# Patient Record
Sex: MALE | Race: WHITE | NOT HISPANIC OR LATINO | Employment: UNEMPLOYED | URBAN - METROPOLITAN AREA
[De-identification: names, ages, dates, MRNs, and addresses within clinical notes are randomized per-mention and may not be internally consistent; named-entity substitution may affect disease eponyms.]

---

## 2020-05-03 ENCOUNTER — HOSPITAL ENCOUNTER (EMERGENCY)
Facility: HOSPITAL | Age: 60
Discharge: HOME | End: 2020-05-03
Attending: EMERGENCY MEDICINE | Admitting: EMERGENCY MEDICINE

## 2020-05-03 VITALS
SYSTOLIC BLOOD PRESSURE: 143 MMHG | HEART RATE: 95 BPM | TEMPERATURE: 97.4 F | RESPIRATION RATE: 14 BRPM | OXYGEN SATURATION: 97 % | WEIGHT: 155.87 LBS | DIASTOLIC BLOOD PRESSURE: 77 MMHG

## 2020-05-03 DIAGNOSIS — T50.901A OVERDOSE: Primary | ICD-10-CM

## 2020-05-03 LAB
ATRIAL RATE: 94 BPM
P AXIS: 85 DEGREES
PR INTERVAL: 130 MS
QRS AXIS: 98 DEGREES
QRSD INTERVAL: 82 MS
QT INTERVAL: 376 MS
QTC INTERVAL: 470 MS
T WAVE AXIS: 83 DEGREES
VENTRICULAR RATE: 94 BPM

## 2020-05-03 PROCEDURE — 93005 ELECTROCARDIOGRAM TRACING: CPT

## 2020-05-03 PROCEDURE — 99284 EMERGENCY DEPT VISIT MOD MDM: CPT

## 2020-05-03 PROCEDURE — 93010 ELECTROCARDIOGRAM REPORT: CPT | Performed by: INTERNAL MEDICINE

## 2020-05-03 PROCEDURE — 99284 EMERGENCY DEPT VISIT MOD MDM: CPT | Performed by: EMERGENCY MEDICINE

## 2020-05-03 RX ORDER — NALOXONE HYDROCHLORIDE 1 MG/ML
1 INJECTION PARENTERAL ONCE
Status: COMPLETED | OUTPATIENT
Start: 2020-05-03 | End: 2020-05-03

## 2021-01-12 ENCOUNTER — APPOINTMENT (EMERGENCY)
Dept: CT IMAGING | Facility: HOSPITAL | Age: 61
DRG: 812 | End: 2021-01-12
Payer: COMMERCIAL

## 2021-01-12 ENCOUNTER — APPOINTMENT (EMERGENCY)
Dept: RADIOLOGY | Facility: HOSPITAL | Age: 61
DRG: 812 | End: 2021-01-12
Payer: COMMERCIAL

## 2021-01-12 ENCOUNTER — HOSPITAL ENCOUNTER (INPATIENT)
Facility: HOSPITAL | Age: 61
LOS: 1 days | Discharge: HOME | DRG: 812 | End: 2021-01-14
Attending: EMERGENCY MEDICINE | Admitting: FAMILY MEDICINE
Payer: COMMERCIAL

## 2021-01-12 DIAGNOSIS — H70.92 MASTOIDITIS OF LEFT SIDE: ICD-10-CM

## 2021-01-12 DIAGNOSIS — F19.10 POLYSUBSTANCE ABUSE (HCC): ICD-10-CM

## 2021-01-12 DIAGNOSIS — R09.02 HYPOXIA: ICD-10-CM

## 2021-01-12 DIAGNOSIS — H66.90 OM (OTITIS MEDIA), ACUTE: ICD-10-CM

## 2021-01-12 DIAGNOSIS — F11.10 HEROIN ABUSE (HCC): Primary | ICD-10-CM

## 2021-01-12 PROBLEM — J96.01 ACUTE RESPIRATORY FAILURE WITH HYPOXIA (HCC): Status: ACTIVE | Noted: 2021-01-12

## 2021-01-12 PROBLEM — N17.9 ACUTE KIDNEY INJURY (HCC): Status: ACTIVE | Noted: 2021-01-12

## 2021-01-12 PROBLEM — T50.901A DRUG OVERDOSE: Status: ACTIVE | Noted: 2021-01-12

## 2021-01-12 LAB
AMPHETAMINES SERPL QL SCN: POSITIVE
ANION GAP SERPL CALCULATED.3IONS-SCNC: 9 MMOL/L (ref 4–13)
ATRIAL RATE: 104 BPM
BARBITURATES UR QL: NEGATIVE
BASOPHILS # BLD AUTO: 0.05 THOUSANDS/ΜL (ref 0–0.1)
BASOPHILS NFR BLD AUTO: 0 % (ref 0–1)
BENZODIAZ UR QL: NEGATIVE
BUN SERPL-MCNC: 15 MG/DL (ref 5–25)
CALCIUM SERPL-MCNC: 8.9 MG/DL (ref 8.3–10.1)
CHLORIDE SERPL-SCNC: 101 MMOL/L (ref 100–108)
CO2 SERPL-SCNC: 28 MMOL/L (ref 21–32)
COCAINE UR QL: NEGATIVE
CREAT SERPL-MCNC: 1.7 MG/DL (ref 0.6–1.3)
EOSINOPHIL # BLD AUTO: 0.04 THOUSAND/ΜL (ref 0–0.61)
EOSINOPHIL NFR BLD AUTO: 0 % (ref 0–6)
ERYTHROCYTE [DISTWIDTH] IN BLOOD BY AUTOMATED COUNT: 13.5 % (ref 11.6–15.1)
FLUAV RNA RESP QL NAA+PROBE: NEGATIVE
FLUBV RNA RESP QL NAA+PROBE: NEGATIVE
GFR SERPL CREATININE-BSD FRML MDRD: 43 ML/MIN/1.73SQ M
GLUCOSE SERPL-MCNC: 88 MG/DL (ref 65–140)
HCT VFR BLD AUTO: 45.7 % (ref 36.5–49.3)
HGB BLD-MCNC: 14.1 G/DL (ref 12–17)
IMM GRANULOCYTES # BLD AUTO: 0.14 THOUSAND/UL (ref 0–0.2)
IMM GRANULOCYTES NFR BLD AUTO: 1 % (ref 0–2)
LYMPHOCYTES # BLD AUTO: 0.91 THOUSANDS/ΜL (ref 0.6–4.47)
LYMPHOCYTES NFR BLD AUTO: 5 % (ref 14–44)
MCH RBC QN AUTO: 27.8 PG (ref 26.8–34.3)
MCHC RBC AUTO-ENTMCNC: 30.9 G/DL (ref 31.4–37.4)
MCV RBC AUTO: 90 FL (ref 82–98)
METHADONE UR QL: NEGATIVE
MONOCYTES # BLD AUTO: 0.85 THOUSAND/ΜL (ref 0.17–1.22)
MONOCYTES NFR BLD AUTO: 5 % (ref 4–12)
NEUTROPHILS # BLD AUTO: 16.04 THOUSANDS/ΜL (ref 1.85–7.62)
NEUTS SEG NFR BLD AUTO: 89 % (ref 43–75)
NRBC BLD AUTO-RTO: 0 /100 WBCS
OPIATES UR QL SCN: NEGATIVE
OXYCODONE+OXYMORPHONE UR QL SCN: NEGATIVE
P AXIS: 72 DEGREES
PCP UR QL: NEGATIVE
PLATELET # BLD AUTO: 328 THOUSANDS/UL (ref 149–390)
PMV BLD AUTO: 9.3 FL (ref 8.9–12.7)
POTASSIUM SERPL-SCNC: 4.2 MMOL/L (ref 3.5–5.3)
PR INTERVAL: 118 MS
QRS AXIS: 99 DEGREES
QRSD INTERVAL: 78 MS
QT INTERVAL: 366 MS
QTC INTERVAL: 481 MS
RBC # BLD AUTO: 5.08 MILLION/UL (ref 3.88–5.62)
RSV RNA RESP QL NAA+PROBE: NEGATIVE
SARS-COV-2 RNA RESP QL NAA+PROBE: NEGATIVE
SODIUM SERPL-SCNC: 138 MMOL/L (ref 136–145)
T WAVE AXIS: 73 DEGREES
THC UR QL: NEGATIVE
TROPONIN I SERPL-MCNC: 0.02 NG/ML
VENTRICULAR RATE: 104 BPM
WBC # BLD AUTO: 18.03 THOUSAND/UL (ref 4.31–10.16)

## 2021-01-12 PROCEDURE — 80307 DRUG TEST PRSMV CHEM ANLYZR: CPT | Performed by: EMERGENCY MEDICINE

## 2021-01-12 PROCEDURE — 99220 PR INITIAL OBSERVATION CARE/DAY 70 MINUTES: CPT | Performed by: STUDENT IN AN ORGANIZED HEALTH CARE EDUCATION/TRAINING PROGRAM

## 2021-01-12 PROCEDURE — 99291 CRITICAL CARE FIRST HOUR: CPT | Performed by: EMERGENCY MEDICINE

## 2021-01-12 PROCEDURE — 71046 X-RAY EXAM CHEST 2 VIEWS: CPT

## 2021-01-12 PROCEDURE — 0241U HB NFCT DS VIR RESP RNA 4 TRGT: CPT | Performed by: EMERGENCY MEDICINE

## 2021-01-12 PROCEDURE — 36415 COLL VENOUS BLD VENIPUNCTURE: CPT | Performed by: EMERGENCY MEDICINE

## 2021-01-12 PROCEDURE — 84484 ASSAY OF TROPONIN QUANT: CPT | Performed by: EMERGENCY MEDICINE

## 2021-01-12 PROCEDURE — 70450 CT HEAD/BRAIN W/O DYE: CPT

## 2021-01-12 PROCEDURE — 93005 ELECTROCARDIOGRAM TRACING: CPT

## 2021-01-12 PROCEDURE — 85025 COMPLETE CBC W/AUTO DIFF WBC: CPT | Performed by: EMERGENCY MEDICINE

## 2021-01-12 PROCEDURE — 80048 BASIC METABOLIC PNL TOTAL CA: CPT | Performed by: EMERGENCY MEDICINE

## 2021-01-12 PROCEDURE — 99285 EMERGENCY DEPT VISIT HI MDM: CPT

## 2021-01-12 PROCEDURE — 93010 ELECTROCARDIOGRAM REPORT: CPT | Performed by: INTERNAL MEDICINE

## 2021-01-12 RX ORDER — SENNOSIDES 8.6 MG
1 TABLET ORAL DAILY
Status: DISCONTINUED | OUTPATIENT
Start: 2021-01-12 | End: 2021-01-14 | Stop reason: HOSPADM

## 2021-01-12 RX ORDER — NALOXONE HYDROCHLORIDE 1 MG/ML
2 INJECTION INTRAMUSCULAR; INTRAVENOUS; SUBCUTANEOUS ONCE
Status: COMPLETED | OUTPATIENT
Start: 2021-01-12 | End: 2021-01-12

## 2021-01-12 RX ORDER — NALOXONE HYDROCHLORIDE 1 MG/ML
1 INJECTION PARENTERAL ONCE
Status: COMPLETED | OUTPATIENT
Start: 2021-01-12 | End: 2021-01-12

## 2021-01-12 RX ORDER — ONDANSETRON 2 MG/ML
4 INJECTION INTRAMUSCULAR; INTRAVENOUS EVERY 6 HOURS PRN
Status: DISCONTINUED | OUTPATIENT
Start: 2021-01-12 | End: 2021-01-14 | Stop reason: HOSPADM

## 2021-01-12 RX ORDER — NALOXONE HYDROCHLORIDE 0.4 MG/ML
1 INJECTION, SOLUTION INTRAMUSCULAR; INTRAVENOUS; SUBCUTANEOUS ONCE
Status: COMPLETED | OUTPATIENT
Start: 2021-01-12 | End: 2021-01-12

## 2021-01-12 RX ORDER — NALOXONE HYDROCHLORIDE 0.4 MG/ML
2 INJECTION, SOLUTION INTRAMUSCULAR; INTRAVENOUS; SUBCUTANEOUS ONCE
Status: DISCONTINUED | OUTPATIENT
Start: 2021-01-12 | End: 2021-01-12

## 2021-01-12 RX ORDER — ACETAMINOPHEN 325 MG/1
650 TABLET ORAL EVERY 6 HOURS PRN
Status: DISCONTINUED | OUTPATIENT
Start: 2021-01-12 | End: 2021-01-14 | Stop reason: HOSPADM

## 2021-01-12 RX ORDER — HEPARIN SODIUM 5000 [USP'U]/ML
5000 INJECTION, SOLUTION INTRAVENOUS; SUBCUTANEOUS EVERY 8 HOURS SCHEDULED
Status: DISCONTINUED | OUTPATIENT
Start: 2021-01-12 | End: 2021-01-14 | Stop reason: HOSPADM

## 2021-01-12 RX ORDER — SODIUM CHLORIDE, SODIUM LACTATE, POTASSIUM CHLORIDE, CALCIUM CHLORIDE 600; 310; 30; 20 MG/100ML; MG/100ML; MG/100ML; MG/100ML
100 INJECTION, SOLUTION INTRAVENOUS CONTINUOUS
Status: DISCONTINUED | OUTPATIENT
Start: 2021-01-12 | End: 2021-01-14 | Stop reason: HOSPADM

## 2021-01-12 RX ORDER — CALCIUM CARBONATE 200(500)MG
1000 TABLET,CHEWABLE ORAL DAILY PRN
Status: DISCONTINUED | OUTPATIENT
Start: 2021-01-12 | End: 2021-01-14 | Stop reason: HOSPADM

## 2021-01-12 RX ORDER — DOCUSATE SODIUM 100 MG/1
100 CAPSULE, LIQUID FILLED ORAL 2 TIMES DAILY
Status: DISCONTINUED | OUTPATIENT
Start: 2021-01-12 | End: 2021-01-14 | Stop reason: HOSPADM

## 2021-01-12 RX ADMIN — SODIUM CHLORIDE, SODIUM LACTATE, POTASSIUM CHLORIDE, AND CALCIUM CHLORIDE 1000 ML: .6; .31; .03; .02 INJECTION, SOLUTION INTRAVENOUS at 09:25

## 2021-01-12 RX ADMIN — NALOXONE HYDROCHLORIDE 1 MG: 0.4 INJECTION, SOLUTION INTRAMUSCULAR; INTRAVENOUS; SUBCUTANEOUS at 09:23

## 2021-01-12 RX ADMIN — SODIUM CHLORIDE, SODIUM LACTATE, POTASSIUM CHLORIDE, AND CALCIUM CHLORIDE 100 ML/HR: .6; .31; .03; .02 INJECTION, SOLUTION INTRAVENOUS at 15:14

## 2021-01-12 RX ADMIN — SENNOSIDES 8.6 MG: 8.6 TABLET ORAL at 15:09

## 2021-01-12 RX ADMIN — NALOXONE HYDROCHLORIDE 0.4 MG/HR: 1 INJECTION PARENTERAL at 21:39

## 2021-01-12 RX ADMIN — NALOXONE HYDROCHLORIDE 2 MG: 1 INJECTION PARENTERAL at 10:59

## 2021-01-12 RX ADMIN — HEPARIN SODIUM 5000 UNITS: 5000 INJECTION INTRAVENOUS; SUBCUTANEOUS at 15:11

## 2021-01-12 RX ADMIN — NALOXONE HYDROCHLORIDE 0.4 MG/HR: 1 INJECTION PARENTERAL at 15:05

## 2021-01-12 RX ADMIN — PIPERACILLIN AND TAZOBACTAM 3.38 G: 36; 4.5 INJECTION, POWDER, FOR SOLUTION INTRAVENOUS at 17:29

## 2021-01-12 NOTE — ASSESSMENT & PLAN NOTE
· Presenting to the ED with drug overdose suspected meth and heroin  · Receive several doses of Narcan with minimal improvement, now on Narcan infusion  · Keep NPO, start aspiration precautions, monitor mental status with serial neuro checks  · Consider poison Control evaluation in a m   The patient's mental status does not improve  · Monitor for signs of withdrawal, will hold off on clonidine, Tylenol, Zofran for now given lethargic mental status  · CT head negative for acute pathology, incidental finding of mastoiditis

## 2021-01-12 NOTE — ASSESSMENT & PLAN NOTE
· Likely secondary to drug overdose, on 3 L supplemental oxygen  · Concern for aspiration pneumonia  · Start Zosyn, follow-up infectious workup

## 2021-01-12 NOTE — ED PROVIDER NOTES
History  Chief Complaint   Patient presents with    Overdose - Accidental     Pt presents following overdose on heroin and meth  EMS found pts friends attempting CPR, laying on the ground surrounded by frozen chicken drenched with water  60-year-old male presents for evaluation after a drug overdose  Per EMS, the patient was found minimally responsive with friends/other drug users attempting CPR  EMS administered 2 mg of Narcan IV and the patient became more responsive  On my initial evaluation the patient is somnolent but arousable  He was able to answer some simple questions but is not able to provide a detailed history of present illness  He admits that he has been on a meth binge and has not slept for about three days  He used heroin this morning was unable to specify how much  Under review the medical record the patient has been seen in the emergency department before for drug overdoses  The patient denies any other medical problems  History provided by:  Patient   used: No        None       History reviewed  No pertinent past medical history  History reviewed  No pertinent surgical history  History reviewed  No pertinent family history  I have reviewed and agree with the history as documented  E-Cigarette/Vaping     E-Cigarette/Vaping Substances     Social History     Tobacco Use    Smoking status: Current Every Day Smoker     Types: Cigars    Smokeless tobacco: Former User   Substance Use Topics    Alcohol use: Not Currently     Frequency: Never    Drug use: Yes     Types: Fentanyl, Methamphetamines, Heroin       Review of Systems   Unable to perform ROS: Acuity of condition       Physical Exam  Physical Exam  Vitals signs and nursing note reviewed  Constitutional:       Appearance: He is well-developed  Comments: Somnolent but arousable  Disheveled  HENT:      Head: Normocephalic and atraumatic        Mouth/Throat:      Mouth: Mucous membranes are moist       Pharynx: Oropharynx is clear  Eyes:      Conjunctiva/sclera: Conjunctivae normal    Neck:      Musculoskeletal: Neck supple  Cardiovascular:      Rate and Rhythm: Normal rate and regular rhythm  Heart sounds: No murmur  Pulmonary:      Effort: Pulmonary effort is normal  No respiratory distress  Breath sounds: Normal breath sounds  Abdominal:      Palpations: Abdomen is soft  Tenderness: There is no abdominal tenderness  Skin:     General: Skin is warm and dry  Capillary Refill: Capillary refill takes less than 2 seconds  Neurological:      General: No focal deficit present  Comments: Somnolent but arousable    No gross focal deficits but unable to participate in detailed neurologic exam          Vital Signs  ED Triage Vitals [01/12/21 0747]   Temp Pulse Respirations Blood Pressure SpO2   -- (!) 111 20 133/72 93 %      Temp src Heart Rate Source Patient Position - Orthostatic VS BP Location FiO2 (%)   -- Monitor Lying Right arm --      Pain Score       --           Vitals:    01/12/21 1230 01/12/21 1315 01/12/21 1330 01/12/21 1400   BP: 159/81  154/84 152/83   Pulse: 99 96 96 97   Patient Position - Orthostatic VS: Lying  Lying Lying         Visual Acuity      ED Medications  Medications   acetaminophen (TYLENOL) tablet 650 mg (has no administration in time range)   calcium carbonate (TUMS) chewable tablet 1,000 mg (has no administration in time range)   docusate sodium (COLACE) capsule 100 mg (has no administration in time range)   senna (SENOKOT) tablet 8 6 mg (has no administration in time range)   ondansetron (ZOFRAN) injection 4 mg (has no administration in time range)   heparin (porcine) subcutaneous injection 5,000 Units (has no administration in time range)   lactated ringers infusion (has no administration in time range)   naloxone (NARCAN) 2 mg in sodium chloride 0 9 % 500 mL infusion (0 4 mg/hr Intravenous New Bag 1/12/21 1505)   naloxone (NARCAN) injection 1 mg (1 mg Intravenous Given 1/12/21 0923)   lactated ringers bolus 1,000 mL (0 mL Intravenous Stopped 1/12/21 1025)   naloxone (NARCAN) injection 2 mg (2 mg Intravenous Given 1/12/21 1059)   naloxone (FOR EMS ONLY) [facility]) 2 MG/2ML injection 2 mg (0 mg Does not apply Given to EMS 1/12/21 1301)       Diagnostic Studies  Results Reviewed     Procedure Component Value Units Date/Time    COVID19, Influenza A/B, RSV PCR, [facility] [[id number]]  (Normal) Collected: 01/12/21 1418    Lab Status: Final result Specimen: Nares from Nasopharyngeal Swab Updated: 01/12/21 1505     SARS-CoV-2 Negative     INFLUENZA A PCR Negative     INFLUENZA B PCR Negative     RSV PCR Negative    Narrative: This test has been authorized by FDA under an EUA (Emergency Use Assay) for use by authorized laboratories  Clinical caution and judgement should be used with the interpretation of these results with consideration of the clinical impression and other laboratory testing  Testing reported as "Positive" or "Negative" has been proven to be accurate according to standard laboratory validation requirements  All testing is performed with control materials showing appropriate reactivity at standard intervals      Rapid drug screen, urine [[id number]]     Lab Status: No result Specimen: Urine     Troponin I [[id number]]  (Normal) Collected: 01/12/21 0847    Lab Status: Final result Specimen: Blood from Hand, Right Updated: 01/12/21 0910     Troponin I 0 02 ng/mL     Basic metabolic panel [[id number]]  (Abnormal) Collected: 01/12/21 0847    Lab Status: Final result Specimen: Blood from Hand, Right Updated: 01/12/21 0900     Sodium 138 mmol/L      Potassium 4 2 mmol/L      Chloride 101 mmol/L      CO2 28 mmol/L      ANION GAP 9 mmol/L      BUN 15 mg/dL      Creatinine 1 70 mg/dL      Glucose 88 mg/dL      Calcium 8 9 mg/dL      eGFR 43 ml/min/1 73sq m     Narrative:      [place] guidelines for Chronic Kidney Disease (CKD):     Stage 1 with normal or high GFR (GFR > 90 mL/min/1 73 square meters)    Stage 2 Mild CKD (GFR = 60-89 mL/min/1 73 square meters)    Stage 3A Moderate CKD (GFR = 45-59 mL/min/1 73 square meters)    Stage 3B Moderate CKD (GFR = 30-44 mL/min/1 73 square meters)    Stage 4 Severe CKD (GFR = 15-29 mL/min/1 73 square meters)    Stage 5 End Stage CKD (GFR <15 mL/min/1 73 square meters)  Note: GFR calculation is accurate only with a steady state creatinine    CBC and differential [[id number]]  (Abnormal) Collected: 01/12/21 0847    Lab Status: Final result Specimen: Blood from Hand, Right Updated: 01/12/21 0852     WBC 18 03 Thousand/uL      RBC 5 08 Million/uL      Hemoglobin 14 1 g/dL      Hematocrit 45 7 %      MCV 90 fL      MCH 27 8 pg      MCHC 30 9 g/dL      RDW 13 5 %      MPV 9 3 fL      Platelets 600 Thousands/uL      nRBC 0 /100 WBCs      Neutrophils Relative 89 %      Immat GRANS % 1 %      Lymphocytes Relative 5 %      Monocytes Relative 5 %      Eosinophils Relative 0 %      Basophils Relative 0 %      Neutrophils Absolute 16 04 Thousands/µL      Immature Grans Absolute 0 14 Thousand/uL      Lymphocytes Absolute 0 91 Thousands/µL      Monocytes Absolute 0 85 Thousand/µL      Eosinophils Absolute 0 04 Thousand/µL      Basophils Absolute 0 05 Thousands/µL                  CT head without contrast   Final Result by Joey Franklin DO (01/12 6235)   No acute intracranial abnormality  Acute on chronic sinusitis  Age-indeterminate mild left mastoiditis  Traceable to fluid in the middle ear canals bilaterally  Workstation performed: [id number]         XR chest 2 views   Final Result by Yulia Gibbons MD (01/12 1025)      No acute cardiopulmonary disease        Workstation performed: [id number]                    Procedures  ECG 12 Lead Documentation Only    Date/Time: 1/12/2021 8:04 AM  Performed by: Tanisha Guerin MD  Authorized by: Tanisha Guerin MD     ECG reviewed by me, the ED Provider: yes    Patient location:  ED  Previous ECG:     Previous ECG:  Compared to current    Comparison ECG info: May of 2020    Similarity:  No change  Interpretation:     Interpretation: abnormal    Rate:     ECG rate:  104    ECG rate assessment: tachycardic    Rhythm:     Rhythm: sinus rhythm    Ectopy:     Ectopy: none    QRS:     QRS axis:  Right    QRS intervals:  Normal  Conduction:     Conduction: normal    ST segments:     ST segments:  Normal  T waves:     T waves: normal    CriticalCare Time  Performed by: Donnell Kothari MD  Authorized by: Donnell Kothari MD     Critical care provider statement:     Critical care time (minutes):  41    Critical care time was exclusive of:  Separately billable procedures and treating other patients and teaching time    Critical care was necessary to treat or prevent imminent or life-threatening deterioration of the following conditions:  Respiratory failure    Critical care was time spent personally by me on the following activities:  Blood draw for specimens, obtaining history from patient or surrogate, development of treatment plan with patient or surrogate, discussions with consultants, evaluation of patient's response to treatment, examination of patient, ordering and performing treatments and interventions, ordering and review of laboratory studies, ordering and review of radiographic studies, re-evaluation of patient's condition and review of old charts    I assumed direction of critical care for this patient from another provider in my specialty: no               ED Course                             SBIRT 20yo+      Most Recent Value   SBIRT (25 yo +)   In order to provide better care to our patients, we are screening all of our patients for alcohol and drug use  Would it be okay to ask you these screening questions?   Unable to answer at this time Filed at: 01/12/2021 0756                    [facility]  Number of Diagnoses or Management Options  Heroin abuse [facility]): new and requires workup  Polysubstance abuse [facility]): new and requires workup  Diagnosis management comments: 52-year-old male presented after a polysubstance overdose  He responded to multiple doses of Narcan but would continue to become hypoxic every time we attempted to wean him off of oxygen by nasal cannula  Does have a elevated white blood cell count of [address] and acute kidney injury with creatinine of 1 7  Given his continued hypoxia will require Narcan drip and admission for observation  CT of the head was negative for traumatic injury  COVID swab is pending  Will continue to reassess  Amount and/or Complexity of Data Reviewed  Clinical lab tests: ordered and reviewed  Tests in the radiology section of CPT®: reviewed and ordered  Review and summarize past medical records: yes  Independent visualization of images, tracings, or specimens: yes        Disposition  Final diagnoses:   Heroin abuse ([facility] [address] )   [facility] ([facility] )   Hypoxia     Time reflects when diagnosis was documented in both MDM as applicable and the Disposition within this note     Time User Action Codes Description Comment    1/12/2021  2:19 PM Oneta Lace Add [F11 10] Heroin abuse ([facility] [address] )     1/12/2021  2:19 PM Oneta Lace Add [F19 10] Polysubstance abuse ([facility] )     1/12/2021  3:05 PM Oneta Lace Add [R09 02] Hypoxia       ED Disposition     ED Disposition Condition Date/Time Comment    Admit Stable Tue Jan 12, 2021  2:19 PM Case was discussed with LÁZARO and the patient's admission status was agreed to be Admission Status: observation status to the service of Dr Justina Wilkerson    None         Patient's Medications    No medications on file     No discharge procedures on file      PDMP Review     None          ED Provider  Electronically Signed by           Lopez Patterson MD  01/12/21 8554

## 2021-01-12 NOTE — H&P
H&P- Riaz Bello 1960, 62 y o  male MRN: [id number]    Unit/Bed#: ED 17 Encounter: [id number]    Primary Care Provider: No primary care provider on file  Date and time admitted to hospital: 1/12/2021  7:44 AM        Drug overdose  Assessment & Plan  · Presenting to the ED with drug overdose suspected meth and heroin  · Receive several doses of Narcan with minimal improvement, now on Narcan infusion  · Keep NPO, start aspiration precautions, monitor mental status with serial neuro checks  · Consider poison Control evaluation in a m  The patient's mental status does not improve  · Monitor for signs of withdrawal, will hold off on clonidine, Tylenol, Zofran for now given lethargic mental status  · CT head negative for acute pathology, incidental finding of mastoiditis    Acute respiratory failure with hypoxia (HCC)  Assessment & Plan  · Likely secondary to drug overdose, on 3 L supplemental oxygen  · Concern for aspiration pneumonia  · Start Zosyn, follow-up infectious workup    Acute kidney injury ([facility] [address] )  Assessment & Plan  · Start IV fluids, monitor creatinine  · Likely prerenal in setting of dehydration    Mastoiditis of left side  Assessment & Plan  · Incidental finding on CT imaging  · Recommend outpatient follow-up      VTE Prophylaxis: Heparin  / sequential compression device   Code Status:  Full code  POLST: POLST form is not discussed and not completed at this time  Anticipated Length of Stay:  Patient will be admitted on an Observation basis with an anticipated length of stay of   2 midnights  Justification for Hospital Stay:  Drug overdose    Total Time for Visit, including Counseling / Coordination of Care: 30 minutes  Greater than 50% of this total time spent on direct patient counseling and coordination of care  Chief Complaint:   Drug overdose    History of Present Illness:    Riaz Bello is a 62 y o  male who presents with drug overdose    Patient was found unresponsive in a drug then surrounded by frozen chickens, getting CPR from his friends, on EMS arrival   No further information can be obtained from patient due to his lethargic/obtunded mental status  Now admitted to Medicine status post 2 rounds of IV Narcan with minimal improvement, on Narcan infusion  Review of Systems:    Unable to obtain review of systems    Past Medical and Surgical History:     History reviewed  No pertinent past medical history  History reviewed  No pertinent surgical history  Meds/Allergies:    Prior to Admission medications    Not on File     I have been unable to obtain / verify an up to date medication list despite all reasonable attempts  Allergies: No Known Allergies    Social History:     Marital Status: Single   Occupation: na  Patient Pre-hospital Living Situation: na  Patient Pre-hospital Level of Mobility: na  Patient Pre-hospital Diet Restrictions: na  Substance Use History:   Social History     Substance and Sexual Activity   Alcohol Use Not Currently    Frequency: Never     Social History     Tobacco Use   Smoking Status Current Every Day Smoker    Types: Cigars   Smokeless Tobacco Former User     Social History     Substance and Sexual Activity   Drug Use Yes    Types: Fentanyl, Methamphetamines, Heroin       Family History:    History reviewed  No pertinent family history  Physical Exam:     Vitals:   Blood Pressure: 152/83 (01/12/21 1400)  Pulse: 97 (01/12/21 1400)  Respirations: 12 (01/12/21 1400)  Height: 5' 9" (175 3 cm) (01/12/21 0747)  Weight - Scale: 75 9 kg (167 lb 5 3 oz) (01/12/21 0747)  SpO2: 97 % (01/12/21 1400)    Physical Exam  Constitutional:       Comments: Malodorous, unkempt   HENT:      Head: Normocephalic and atraumatic  Nose: Nose normal  No rhinorrhea  Cardiovascular:      Rate and Rhythm: Normal rate and regular rhythm  Pulmonary:      Comments: On 3 L supplemental oxygen  Not in respiratory distress  Abdominal:      General: Abdomen is flat  Palpations: Abdomen is soft  Musculoskeletal:      Right lower leg: No edema  Left lower leg: No edema  Skin:     General: Skin is warm and dry  Neurological:      Mental Status: He is disoriented  Comments: Drowsy mental status   Psychiatric:      Comments: Mood and behavior abnormal           Additional Data:     Lab Results: I have personally reviewed pertinent reports  Results from last 7 days   Lab Units 01/12/21  0847   WBC Thousand/uL 18 03*   HEMOGLOBIN g/dL 14 1   HEMATOCRIT % 45 7   PLATELETS Thousands/uL 328   NEUTROS PCT % 89*   LYMPHS PCT % 5*   MONOS PCT % 5   EOS PCT % 0     Results from last 7 days   Lab Units 01/12/21  0847   SODIUM mmol/L 138   POTASSIUM mmol/L 4 2   CHLORIDE mmol/L 101   CO2 mmol/L 28   BUN mg/dL 15   CREATININE mg/dL 1 70*   ANION GAP mmol/L 9   CALCIUM mg/dL 8 9   GLUCOSE RANDOM mg/dL 88                       Imaging: I have personally reviewed pertinent reports  CT head without contrast   Final Result by Marcelle Pacheco DO (01/12 1405)   No acute intracranial abnormality  Acute on chronic sinusitis  Age-indeterminate mild left mastoiditis  Traceable to fluid in the middle ear canals bilaterally  Workstation performed: [id number]         XR chest 2 views   Final Result by Eusebio Tolbert MD (01/12 1025)      No acute cardiopulmonary disease  Workstation performed: [id number]             EKG, Pathology, and Other Studies Reviewed on Admission:   · EKG:  Sinus tachycardia    Allscripts / Epic Records Reviewed: Yes     ** Please Note: This note has been constructed using a voice recognition system   **

## 2021-01-13 LAB
ANION GAP SERPL CALCULATED.3IONS-SCNC: 4 MMOL/L (ref 4–13)
BUN SERPL-MCNC: 12 MG/DL (ref 5–25)
CALCIUM SERPL-MCNC: 8.1 MG/DL (ref 8.3–10.1)
CHLORIDE SERPL-SCNC: 101 MMOL/L (ref 100–108)
CO2 SERPL-SCNC: 29 MMOL/L (ref 21–32)
CREAT SERPL-MCNC: 1.27 MG/DL (ref 0.6–1.3)
ERYTHROCYTE [DISTWIDTH] IN BLOOD BY AUTOMATED COUNT: 13.5 % (ref 11.6–15.1)
GFR SERPL CREATININE-BSD FRML MDRD: 61 ML/MIN/1.73SQ M
GLUCOSE P FAST SERPL-MCNC: 98 MG/DL (ref 65–99)
GLUCOSE SERPL-MCNC: 98 MG/DL (ref 65–140)
HCT VFR BLD AUTO: 38.9 % (ref 36.5–49.3)
HGB BLD-MCNC: 12.3 G/DL (ref 12–17)
MAGNESIUM SERPL-MCNC: 1.8 MG/DL (ref 1.6–2.6)
MCH RBC QN AUTO: 27.5 PG (ref 26.8–34.3)
MCHC RBC AUTO-ENTMCNC: 31.6 G/DL (ref 31.4–37.4)
MCV RBC AUTO: 87 FL (ref 82–98)
PLATELET # BLD AUTO: 252 THOUSANDS/UL (ref 149–390)
PMV BLD AUTO: 9.4 FL (ref 8.9–12.7)
POTASSIUM SERPL-SCNC: 3.9 MMOL/L (ref 3.5–5.3)
RBC # BLD AUTO: 4.48 MILLION/UL (ref 3.88–5.62)
SODIUM SERPL-SCNC: 134 MMOL/L (ref 136–145)
WBC # BLD AUTO: 16.52 THOUSAND/UL (ref 4.31–10.16)

## 2021-01-13 PROCEDURE — 36415 COLL VENOUS BLD VENIPUNCTURE: CPT | Performed by: STUDENT IN AN ORGANIZED HEALTH CARE EDUCATION/TRAINING PROGRAM

## 2021-01-13 PROCEDURE — 83735 ASSAY OF MAGNESIUM: CPT | Performed by: STUDENT IN AN ORGANIZED HEALTH CARE EDUCATION/TRAINING PROGRAM

## 2021-01-13 PROCEDURE — 85027 COMPLETE CBC AUTOMATED: CPT | Performed by: STUDENT IN AN ORGANIZED HEALTH CARE EDUCATION/TRAINING PROGRAM

## 2021-01-13 PROCEDURE — 80048 BASIC METABOLIC PNL TOTAL CA: CPT | Performed by: STUDENT IN AN ORGANIZED HEALTH CARE EDUCATION/TRAINING PROGRAM

## 2021-01-13 PROCEDURE — 99232 SBSQ HOSP IP/OBS MODERATE 35: CPT | Performed by: NURSE PRACTITIONER

## 2021-01-13 RX ORDER — VANCOMYCIN HYDROCHLORIDE 1 G/200ML
12.5 INJECTION, SOLUTION INTRAVENOUS EVERY 12 HOURS
Status: DISCONTINUED | OUTPATIENT
Start: 2021-01-13 | End: 2021-01-14 | Stop reason: HOSPADM

## 2021-01-13 RX ADMIN — PIPERACILLIN AND TAZOBACTAM 3.38 G: 36; 4.5 INJECTION, POWDER, FOR SOLUTION INTRAVENOUS at 12:58

## 2021-01-13 RX ADMIN — PIPERACILLIN AND TAZOBACTAM 3.38 G: 36; 4.5 INJECTION, POWDER, FOR SOLUTION INTRAVENOUS at 07:06

## 2021-01-13 RX ADMIN — HEPARIN SODIUM 5000 UNITS: 5000 INJECTION INTRAVENOUS; SUBCUTANEOUS at 14:36

## 2021-01-13 RX ADMIN — VANCOMYCIN HYDROCHLORIDE 1000 MG: 1 INJECTION, SOLUTION INTRAVENOUS at 22:03

## 2021-01-13 RX ADMIN — PIPERACILLIN AND TAZOBACTAM 3.38 G: 36; 4.5 INJECTION, POWDER, FOR SOLUTION INTRAVENOUS at 00:00

## 2021-01-13 RX ADMIN — PIPERACILLIN AND TAZOBACTAM 3.38 G: 36; 4.5 INJECTION, POWDER, FOR SOLUTION INTRAVENOUS at 20:28

## 2021-01-13 RX ADMIN — SODIUM CHLORIDE, SODIUM LACTATE, POTASSIUM CHLORIDE, AND CALCIUM CHLORIDE 100 ML/HR: .6; .31; .03; .02 INJECTION, SOLUTION INTRAVENOUS at 07:06

## 2021-01-13 NOTE — ASSESSMENT & PLAN NOTE
· Likely secondary to drug overdose, on 1-2 L supplemental oxygen  · Chest x-ray negative for pneumonia  · Hypoxia continuing to improve with Narcan drip overnight

## 2021-01-13 NOTE — UTILIZATION REVIEW
Initial Clinical Review    OBS order 1/12 1422 converted to IP on 1/13 @ 1256 for drug  Overdose w/ acute resp failure w/ hypoxia  Level of Care Med Surg    Estimated length of stay More than 2 Midnights    Certification I certify that inpatient services are medically necessary for this patient for a duration of greater than two midnights  See H&P and MD Progress Notes for additional information about the patient's course of treatment  ED Arrival Information     Expected Arrival Acuity Means of Arrival Escorted By Service Admission Type    - 1/12/2021 07:44 Urgent Ambulance [facility]) Hospitalist Urgent    Arrival Complaint    OVERDOSE        Chief Complaint   Patient presents with    Overdose - Accidental     Pt presents following overdose on heroin and meth  EMS found pts friends attempting CPR, laying on the ground surrounded by frozen chicken drenched with water  Assessment/Plan: 61 yo male to ED via EMS  , was found unresponsive surrounded by frozen chickens, getting CPR from his friends on EMSs arrival   Given Narcan x2 for his lethargic obtunded mental status  Minimal improvement after Narcan bolus ,  Placed on infusion   Admitted OBS status for drug overdose  Suspected meth and heroin   Keep NPO , aspiration precautions , serial neuro checks   Monitor for signs of withdrae , hold off on clonidine , tylenol , zofran for lethargy   CT head neg   On 3l NC , concern for aspiration , start zosyn f/u labs   ANCA start IVF and monitor   1/13 IM Note   Pt reports some chest tenderness   Admits he used meth yest from a different supplier  Hypoxia cont to improve w/ Narcan gtt over night   On 1-2 l NC   Cont aspiration precautions  Incidental finding of mastoiditis L side found on CT , pt w/ dec hearing and bloody pus oozing from ear  Plan to cont zosyn        ED Triage Vitals [01/12/21 0747]   Temperature Pulse Respirations Blood Pressure SpO2   98 1 °F (36 7 °C) (!) 111 20 133/72 93 % Temp Source Heart Rate Source Patient Position - Orthostatic VS BP Location FiO2 (%)   Oral Monitor Lying Right arm --      Pain Score       --          Wt Readings from Last 1 Encounters:   01/12/21 75 9 kg (167 lb 5 3 oz)     Additional Vital Signs:   01/13/21 0600    88  15  138/78  103  98 %  36  4 L/min  Nasal cannula  Lying   01/13/21 0545    91  15      97 %           01/13/21 0456    101  16  114/61  80  97 %  36  4 L/min  Nasal cannula  Sitting   01/13/21 0000    97  19  121/64  86  93 %  36  4 L/min  Nasal cannula  Lying   01/12/21 2130    101  17  135/71  96  97 %  36  4 L/min  Nasal cannula  Sitting   01/12/21 2030    101  16  155/73  105  90 %  36  4 L/min  Nasal cannula  Sitting   01/12/21 2000    100  19  150/74  105  91 %  36  4 L/min  Nasal cannula  Lying   01/12/21 1400    97  12  152/83  110  97 %  32  3 L/min  Nasal cannula  Lying   01/12/21 1330    96  14  154/84  112  93 %  32  3 L/min  Nasal cannula  Lying   01/12/21 1315    96  17      86 %Abnormal        None (Room air)     SpO2: provider aware at 01/12/21 1315   01/12/21 1230    99  15  159/81  111  94 %  28  2 L/min  Nasal cannula  Lying   01/12/21 1159            94 %  28  2 L/min  Nasal cannula     01/12/21 1130    101  20  147/91  110  97 %      None (Room air)  Lying   01/12/21 1045    100  16  154/88    93 %  36  4 L/min  Nasal cannula  Lying   01/12/21 0825            92 %  36  4 L/min  Nasal cannula     01/12/21 0815    100  16      85 %Abnormal                 Pertinent Labs/Diagnostic Test Results:   1/12 EKG  ST   1/12 CT head No acute intracranial abnormality  Acute on chronic sinusitis  Age-indeterminate mild left mastoiditis    Traceable to fluid in the middle ear canals bilaterally      1/12 CXR    No acute cardiopulmonary disease  Results from last 7 days   Lab Units 01/12/21  1418   SARS-COV-2  Negative     Results from last 7 days   Lab Units 01/13/21  0456 01/12/21  0847   WBC Thousand/uL 16 52* 18 03*   HEMOGLOBIN g/dL 12 3 14 1   HEMATOCRIT % 38 9 45 7   PLATELETS Thousands/uL 252 328   NEUTROS ABS Thousands/µL  --  16 04*     Results from last 7 days   Lab Units 01/13/21  0456 01/12/21  0847   SODIUM mmol/L 134* 138   POTASSIUM mmol/L 3 9 4 2   CHLORIDE mmol/L 101 101   CO2 mmol/L 29 28   ANION GAP mmol/L 4 9   BUN mg/dL 12 15   CREATININE mg/dL 1 27 1 70*   EGFR ml/min/1 73sq m 61 43   CALCIUM mg/dL 8 1* 8 9   MAGNESIUM mg/dL 1 8  --      Results from last 7 days   Lab Units 01/13/21  0456 01/12/21  0847   GLUCOSE RANDOM mg/dL 98 88       Results from last 7 days   Lab Units 01/12/21  0847   TROPONIN I ng/mL 0 02     Results from last 7 days   Lab Units 01/12/21  1418   INFLUENZA A PCR  Negative   INFLUENZA B PCR  Negative   RSV PCR  Negative     Results from last 7 days   Lab Units 01/12/21  1745   AMPH/METH  Positive*   BARBITURATE UR  Negative   BENZODIAZEPINE UR  Negative   COCAINE UR  Negative   METHADONE URINE  Negative   OPIATE UR  Negative   PCP UR  Negative   THC UR  Negative     ED Treatment:   Medication Administration from 01/12/2021 0744 to 01/13/2021 0741       Date/Time Order Dose Route Action     01/12/2021 0923 naloxone (NARCAN) injection 1 mg 1 mg Intravenous Given     01/12/2021 0925 lactated ringers bolus 1,000 mL 1,000 mL Intravenous New Bag     01/12/2021 1059 naloxone (NARCAN) injection 2 mg 2 mg Intravenous Given     01/12/2021 1301 naloxone (FOR EMS ONLY) [facility]) 2 MG/2ML injection 2 mg 0 mg Does not apply Given to EMS     01/12/2021 1509 senna (SENOKOT) tablet 8 6 mg 8 6 mg Oral Given     01/12/2021 1511 heparin (porcine) subcutaneous injection 5,000 Units 5,000 Units Subcutaneous Given     01/13/2021 0706 lactated ringers infusion 100 mL/hr Intravenous New Bag     01/12/2021 1514 lactated ringers infusion 100 mL/hr Intravenous New Bag     01/12/2021 2139 naloxone (NARCAN) 2 mg in sodium chloride 0 9 % 500 mL infusion 0 4 mg/hr Intravenous New Bag 01/12/2021 1505 naloxone (NARCAN) 2 mg in sodium chloride 0 9 % 500 mL infusion 0 4 mg/hr Intravenous New Bag     01/13/2021 0706 piperacillin-tazobactam (ZOSYN) 3 375 g in sodium chloride 0 9 % 100 mL IVPB 3 375 g Intravenous New Bag     01/13/2021 0000 piperacillin-tazobactam (ZOSYN) 3 375 g in sodium chloride 0 9 % 100 mL IVPB 3 375 g Intravenous New Bag     01/12/2021 1729 piperacillin-tazobactam (ZOSYN) 3 375 g in sodium chloride 0 9 % 100 mL IVPB 3 375 g Intravenous New Bag        History reviewed  No pertinent past medical history  Present on Admission:  **None**      Admitting Diagnosis: Overdose [T50 901A]  Age/Sex: 62 y o  male  Admission Orders:  Scheduled Medications:  docusate sodium, 100 mg, Oral, BID  heparin (porcine), 5,000 Units, Subcutaneous, Q8H [facility]  piperacillin-tazobactam, 3 375 g, Intravenous, Q6H  senna, 1 tablet, Oral, Daily      Continuous IV Infusions:  lactated ringers, 100 mL/hr, Intravenous, Continuous  naloxone (NARCAN) 500 mL infusion, 0 4 mg/hr, Intravenous, Continuous  Stopped 1/13 1254      PRN Meds:  acetaminophen, 650 mg, Oral, Q6H PRN  calcium carbonate, 1,000 mg, Oral, Daily PRN  ondansetron, 4 mg, Intravenous, Q6H PRN    Aspiration precautions  NPO   Cont pulse ox      Network Utilization Review Department  ATTENTION: Please call with any questions or concerns to [phone] and carefully listen to the prompts so that you are directed to the right person  All voicemails are confidential   Vilma Koenig all requests for admission clinical reviews, approved or denied determinations and any other requests to dedicated fax number below belonging to the campus where the patient is receiving treatment   List of dedicated fax numbers for the Facilities:  [address] DENIALS (Administrative/Medical Necessity) [phone]   [address] (Maternity/NICU/Pediatrics) [phone]   5000 [facility] [place] [address] [phone]   8049 [facility] [phone]   [facility] [address] ([facility]) [address] [place] [address] [address] [phone]   [place] [address] [phone]

## 2021-01-13 NOTE — PROGRESS NOTES
Progress Note - Julianne Adam 1960, 62 y o  male MRN: [id number]    Unit/Bed#: ED 17 Encounter: [id number]    Primary Care Provider: No primary care provider on file  Date and time admitted to hospital: 1/12/2021  7:44 AM    Mastoiditis of left side  Assessment & Plan  · Incidental finding on CT imaging  · Patient has decreased hearing, and bloody pus oozing out of his right ear  · Continue Zosyn can discharge on Augmentin  · Leukocytosis persistent however improving    * Drug overdose  Assessment & Plan  · Presenting to the ED with drug overdose suspected meth and heroin  · Receive several doses of Narcan with minimal improvement, however did have moderate improvement with Narcan infusion  · Tolerating a regular diet now, continue aspiration precautions  · P r n  Tylenol and Zofran  · CT head negative for acute pathology, incidental finding of mastoiditis    Acute respiratory failure with hypoxia (HCC)  Assessment & Plan  · Likely secondary to drug overdose, on 1-2 L supplemental oxygen  · Chest x-ray negative for pneumonia  · Hypoxia continuing to improve with Narcan drip overnight    Acute kidney injury ([address] )  Assessment & Plan  · Resolved with IV fluids     VTE Pharmacologic Prophylaxis:   Pharmacologic: Heparin  Mechanical VTE Prophylaxis in Place: Yes    Patient Centered Rounds: I have performed bedside rounds with nursing staff today  Discussions with Specialists or Other Care Team Provider:  Reviewed H&P discussed with case management primary RN    Education and Discussions with Family / Patient:  Discussed plan of care with patient denies any additional questions or concerns at this time    Time Spent for Care: 20 minutes  More than 50% of total time spent on counseling and coordination of care as described above      Current Length of Stay: 0 day(s)    Current Patient Status: Inpatient   Certification Statement: The patient will continue to require additional inpatient hospital stay due to IV antibiotics, monitoring of leukocytosis, continue weaning of patient's oxygen requirements    Discharge Plan / Estimated Discharge Date:  Tomorrow      Code Status: Level 1 - Full Code      Subjective:   Patient reports some chest tenderness denies any shortness of breath or difficulty breathing  Reports that he used meth yesterday from a different person that he typically uses meth from  Lives with his cousin  Denies any headache lightheadedness dizziness or blurry vision denies any nausea or vomiting    Objective:     Vitals:   No data recorded  HR:  [] 85  Resp:  [12-19] 16  BP: (114-156)/(61-84) 156/79  SpO2:  [86 %-98 %] 97 %  Body mass index is 24 71 kg/m²  Input and Output Summary (last 24 hours): Intake/Output Summary (Last 24 hours) at 1/13/2021 1258  Last data filed at 1/13/2021 2105  Gross per 24 hour   Intake 1200 ml   Output 350 ml   Net 850 ml       Physical Exam:     Physical Exam  Vitals signs and nursing note reviewed  Constitutional:       Appearance: He is underweight  He is ill-appearing and toxic-appearing  Interventions: Nasal cannula in place  HENT:      Head: Normocephalic  Right Ear: Decreased hearing noted  Drainage and tenderness present  There is mastoid tenderness  Neck:      Musculoskeletal: Normal range of motion  Cardiovascular:      Rate and Rhythm: Normal rate  Pulses: Normal pulses  Pulmonary:      Effort: Pulmonary effort is normal    Abdominal:      General: Abdomen is flat  Musculoskeletal: Normal range of motion  Skin:     General: Skin is warm  Neurological:      General: No focal deficit present  Mental Status: He is easily aroused  Mental status is at baseline  He is lethargic  Psychiatric:         Mood and Affect: Mood normal          Behavior: Behavior is cooperative           Additional Data:     Labs:    Results from last 7 days   Lab Units 01/13/21  0456 01/12/21  0847   WBC Thousand/uL 16 52* 18 03* HEMOGLOBIN g/dL 12 3 14 1   HEMATOCRIT % 38 9 45 7   PLATELETS Thousands/uL 252 328   NEUTROS PCT %  --  89*   LYMPHS PCT %  --  5*   MONOS PCT %  --  5   EOS PCT %  --  0     Results from last 7 days   Lab Units 01/13/21  0456   POTASSIUM mmol/L 3 9   CHLORIDE mmol/L 101   CO2 mmol/L 29   BUN mg/dL 12   CREATININE mg/dL 1 27   CALCIUM mg/dL 8 1*           * I Have Reviewed All Lab Data Listed Above  * Additional Pertinent Lab Tests Reviewed: All [facility] Reviewed  Recent Cultures (last 7 days):           Last 24 Hours Medication List:   Current Facility-Administered Medications   Medication Dose Route Frequency Provider Last Rate    acetaminophen  650 mg Oral Q6H PRN Pola Connell MD      calcium carbonate  1,000 mg Oral Daily PRN Pola Connell MD      docusate sodium  100 mg Oral BID Pola Connell MD      heparin (porcine)  5,000 Units Subcutaneous Q8H [address] Pola Connell MD      lactated ringers  100 mL/hr Intravenous Continuous Pola Connell  mL/hr (01/13/21 0706)    ondansetron  4 mg Intravenous Q6H PRN Pola Connell MD      piperacillin-tazobactam  3 375 g Intravenous Q6H ALVARADO Hua 3 375 g (01/13/21 1258)    senna  1 tablet Oral Daily Pola Connell MD          Today, Patient Was Seen By: ALVARADO Hua    ** Please Note: Dragon 360 Dictation voice to text software may have been used in the creation of this document   **

## 2021-01-13 NOTE — ASSESSMENT & PLAN NOTE
· Presenting to the ED with drug overdose suspected meth and heroin  · Receive several doses of Narcan with minimal improvement, however did have moderate improvement with Narcan infusion  · Tolerating a regular diet now, continue aspiration precautions  · P r n   Tylenol and Zofran  · CT head negative for acute pathology, incidental finding of mastoiditis

## 2021-01-13 NOTE — PLAN OF CARE
Problem: Nutrition/Hydration-ADULT  Goal: Nutrient/Hydration intake appropriate for improving, restoring or maintaining nutritional needs  Description: Monitor and assess patient's nutrition/hydration status for malnutrition  Collaborate with interdisciplinary team and initiate plan and interventions as ordered  Monitor patient's weight and dietary intake as ordered or per policy  Utilize nutrition screening tool and intervene as necessary  Determine patient's food preferences and provide high-protein, high-caloric foods as appropriate       INTERVENTIONS:  - Monitor oral intake, urinary output, labs, and treatment plans  - Assess nutrition and hydration status and recommend course of action  - Evaluate amount of meals eaten  - Assist patient with eating if necessary   - Allow adequate time for meals  - Recommend/ encourage appropriate diets, oral nutritional supplements, and vitamin/mineral supplements  - Order, calculate, and assess calorie counts as needed  - Recommend, monitor, and adjust tube feedings and TPN/PPN based on assessed needs  - Assess need for intravenous fluids  - Provide specific nutrition/hydration education as appropriate  - Include patient/family/caregiver in decisions related to nutrition  Outcome: Progressing     Problem: PAIN - ADULT  Goal: Verbalizes/displays adequate comfort level or baseline comfort level  Description: Interventions:  - Encourage patient to monitor pain and request assistance  - Assess pain using appropriate pain scale  - Administer analgesics based on type and severity of pain and evaluate response  - Implement non-pharmacological measures as appropriate and evaluate response  - Consider cultural and social influences on pain and pain management  - Notify physician/advanced practitioner if interventions unsuccessful or patient reports new pain  Outcome: Progressing     Problem: INFECTION - ADULT  Goal: Absence or prevention of progression during hospitalization  Description: INTERVENTIONS:  - Assess and monitor for signs and symptoms of infection  - Monitor lab/diagnostic results  - Monitor all insertion sites, i e  indwelling lines, tubes, and drains  - Monitor endotracheal if appropriate and nasal secretions for changes in amount and color  - [place] appropriate cooling/warming therapies per order  - Administer medications as ordered  - Instruct and encourage patient and family to use good hand hygiene technique  - Identify and instruct in appropriate isolation precautions for identified infection/condition  Outcome: Progressing  Goal: Absence of fever/infection during neutropenic period  Description: INTERVENTIONS:  - Monitor WBC    Outcome: Progressing     Problem: SAFETY ADULT  Goal: Patient will remain free of falls  Description: INTERVENTIONS:  - Assess patient frequently for physical needs  -  Identify cognitive and physical deficits and behaviors that affect risk of falls    -  [place] fall precautions as indicated by assessment   - Educate patient/family on patient safety including physical limitations  - Instruct patient to call for assistance with activity based on assessment  - Modify environment to reduce risk of injury  - Consider OT/PT consult to assist with strengthening/mobility  Outcome: Progressing  Goal: Maintain or return to baseline ADL function  Description: INTERVENTIONS:  -  Assess patient's ability to carry out ADLs; assess patient's baseline for ADL function and identify physical deficits which impact ability to perform ADLs (bathing, care of mouth/teeth, toileting, grooming, dressing, etc )  - Assess/evaluate cause of self-care deficits   - Assess range of motion  - Assess patient's mobility; develop plan if impaired  - Assess patient's need for assistive devices and provide as appropriate  - Encourage maximum independence but intervene and supervise when necessary  - Involve family in performance of ADLs  - Assess for home care needs following discharge   - Consider OT consult to assist with ADL evaluation and planning for discharge  - Provide patient education as appropriate  Outcome: Progressing  Goal: Maintain or return mobility status to optimal level  Description: INTERVENTIONS:  - Assess patient's baseline mobility status (ambulation, transfers, stairs, etc )    - Identify cognitive and physical deficits and behaviors that affect mobility  - Identify mobility aids required to assist with transfers and/or ambulation (gait belt, sit-to-stand, lift, walker, cane, etc )  - [place] fall precautions as indicated by assessment  - Record patient progress and toleration of activity level on Mobility SBAR; progress patient to next Phase/Stage  - Instruct patient to call for assistance with activity based on assessment  - Consider rehabilitation consult to assist with strengthening/weightbearing, etc   Outcome: Progressing     Problem: DISCHARGE PLANNING  Goal: Discharge to home or other facility with appropriate resources  Description: INTERVENTIONS:  - Identify barriers to discharge w/patient and caregiver  - Arrange for needed discharge resources and transportation as appropriate  - Identify discharge learning needs (meds, wound care, etc )  - Arrange for interpretive services to assist at discharge as needed  - Refer to Case Management Department for coordinating discharge planning if the patient needs post-hospital services based on physician/advanced practitioner order or complex needs related to functional status, cognitive ability, or social support system  Outcome: Progressing     Problem: Knowledge Deficit  Goal: Patient/family/caregiver demonstrates understanding of disease process, treatment plan, medications, and discharge instructions  Description: Complete learning assessment and assess knowledge base    Interventions:  - Provide teaching at level of understanding  - Provide teaching via preferred learning methods  Outcome: Progressing

## 2021-01-13 NOTE — ASSESSMENT & PLAN NOTE
· Incidental finding on CT imaging  · Patient has decreased hearing, and bloody pus oozing out of his right ear  · Continue Zosyn can discharge on Augmentin  · Leukocytosis persistent however improving

## 2021-01-13 NOTE — ED NOTES
Pt offered menu by this writer but declined at this time   Pt given ginger ale per request      Rina Adams  01/13/21 0911

## 2021-01-14 VITALS
OXYGEN SATURATION: 95 % | HEART RATE: 82 BPM | SYSTOLIC BLOOD PRESSURE: 130 MMHG | WEIGHT: 167.33 LBS | BODY MASS INDEX: 24.78 KG/M2 | HEIGHT: 69 IN | RESPIRATION RATE: 14 BRPM | TEMPERATURE: 98.7 F | DIASTOLIC BLOOD PRESSURE: 82 MMHG

## 2021-01-14 PROBLEM — H66.009 ACUTE SUPPURATIVE OTITIS MEDIA: Status: ACTIVE | Noted: 2021-01-14

## 2021-01-14 LAB
ANION GAP SERPL CALCULATED.3IONS-SCNC: 6 MMOL/L (ref 4–13)
BUN SERPL-MCNC: 11 MG/DL (ref 5–25)
CALCIUM SERPL-MCNC: 8.4 MG/DL (ref 8.3–10.1)
CHLORIDE SERPL-SCNC: 100 MMOL/L (ref 100–108)
CO2 SERPL-SCNC: 26 MMOL/L (ref 21–32)
CREAT SERPL-MCNC: 1.12 MG/DL (ref 0.6–1.3)
ERYTHROCYTE [DISTWIDTH] IN BLOOD BY AUTOMATED COUNT: 13.2 % (ref 11.6–15.1)
GFR SERPL CREATININE-BSD FRML MDRD: 72 ML/MIN/1.73SQ M
GLUCOSE SERPL-MCNC: 104 MG/DL (ref 65–140)
HCT VFR BLD AUTO: 38.8 % (ref 36.5–49.3)
HGB BLD-MCNC: 12.4 G/DL (ref 12–17)
MCH RBC QN AUTO: 27.5 PG (ref 26.8–34.3)
MCHC RBC AUTO-ENTMCNC: 32 G/DL (ref 31.4–37.4)
MCV RBC AUTO: 86 FL (ref 82–98)
PLATELET # BLD AUTO: 227 THOUSANDS/UL (ref 149–390)
PMV BLD AUTO: 9.5 FL (ref 8.9–12.7)
POTASSIUM SERPL-SCNC: 3.9 MMOL/L (ref 3.5–5.3)
RBC # BLD AUTO: 4.51 MILLION/UL (ref 3.88–5.62)
SODIUM SERPL-SCNC: 132 MMOL/L (ref 136–145)
WBC # BLD AUTO: 7.64 THOUSAND/UL (ref 4.31–10.16)

## 2021-01-14 PROCEDURE — 80048 BASIC METABOLIC PNL TOTAL CA: CPT | Performed by: NURSE PRACTITIONER

## 2021-01-14 PROCEDURE — 99239 HOSP IP/OBS DSCHRG MGMT >30: CPT | Performed by: FAMILY MEDICINE

## 2021-01-14 PROCEDURE — 85027 COMPLETE CBC AUTOMATED: CPT | Performed by: NURSE PRACTITIONER

## 2021-01-14 RX ORDER — CIPROFLOXACIN AND DEXAMETHASONE 3; 1 MG/ML; MG/ML
4 SUSPENSION/ DROPS AURICULAR (OTIC) 2 TIMES DAILY
Qty: 7.5 ML | Refills: 0 | Status: SHIPPED | OUTPATIENT
Start: 2021-01-14 | End: 2021-01-24

## 2021-01-14 RX ORDER — CIPROFLOXACIN AND DEXAMETHASONE 3; 1 MG/ML; MG/ML
4 SUSPENSION/ DROPS AURICULAR (OTIC) 2 TIMES DAILY
Status: DISCONTINUED | OUTPATIENT
Start: 2021-01-14 | End: 2021-01-14 | Stop reason: HOSPADM

## 2021-01-14 RX ORDER — AMOXICILLIN AND CLAVULANATE POTASSIUM 875; 125 MG/1; MG/1
1 TABLET, FILM COATED ORAL EVERY 12 HOURS SCHEDULED
Qty: 20 TABLET | Refills: 0 | Status: SHIPPED | OUTPATIENT
Start: 2021-01-14 | End: 2021-01-24

## 2021-01-14 RX ADMIN — VANCOMYCIN HYDROCHLORIDE 1000 MG: 1 INJECTION, SOLUTION INTRAVENOUS at 09:40

## 2021-01-14 RX ADMIN — PIPERACILLIN AND TAZOBACTAM 3.38 G: 36; 4.5 INJECTION, POWDER, FOR SOLUTION INTRAVENOUS at 15:22

## 2021-01-14 RX ADMIN — PIPERACILLIN AND TAZOBACTAM 3.38 G: 36; 4.5 INJECTION, POWDER, FOR SOLUTION INTRAVENOUS at 01:43

## 2021-01-14 RX ADMIN — SODIUM CHLORIDE, SODIUM LACTATE, POTASSIUM CHLORIDE, AND CALCIUM CHLORIDE 100 ML/HR: .6; .31; .03; .02 INJECTION, SOLUTION INTRAVENOUS at 07:26

## 2021-01-14 RX ADMIN — PIPERACILLIN AND TAZOBACTAM 3.38 G: 36; 4.5 INJECTION, POWDER, FOR SOLUTION INTRAVENOUS at 06:22

## 2021-01-14 NOTE — ASSESSMENT & PLAN NOTE
· ENT saw the patient and cleared for DC with oral augmentin at DC and ciprodex drops  · OP follow up recommended  · ENT not worried about the mastoiditis   · Was treated with IV zosyn and vanc here

## 2021-01-14 NOTE — CASE MANAGEMENT
Cm met with patient at bedside to complete opening assessment and discuss discharge planning  Patient reported that he cannot hear  Cm wrote a message on paper asking if cm could call a family member to ask general assessment questions  Patient reported the number to call is [phone]  Alvarez called the listed number and got a voicemail  Cm left a message requesting a call back to the number   Cm department will continue to follow patient through discharge

## 2021-01-14 NOTE — CONSULTS
ENT Note    Reviewed chart, history, and imaging  At this point, I am not concerned for mastoiditis  Given concern for ear drainage, would start Ciprodex drops 4 drops BID  Can continue antibiotics; switch to Augmentin when changed over to oral antibiotics  Plan for outpatient follow up next week after treatment -- at that point drainage will likely subside and can perform hearing test if still have hearing difficulty    Discussed with the treatment team     Hamzah Cuevas MD

## 2021-01-14 NOTE — CASE MANAGEMENT
LOS: 1 DAY  PATIENT IS NOT A BUNDLE  PATIENT IS NOT A 30 DAY READMISSION  UNPLANNED READMISSION RISK SCORE IS 10 (GREEN/LOW)  Cm called patient's sister due to patient being unable to hear  Spoke to Hever Blancas at [phone] to complete opening assessment and discuss discharge planning  Hever Blancas reported that patient lives in a 2 story home in [place], [place] with his cousins  There are 3 RADHA and he has no issues with stairs  Patient uses no DME and was IPTA with ADLs  Patient has no rehab or [facility] hx  Sister reported that patient has no preferred pharmacy, but [facility] his house  He also has no PCP  Patient has no MH hx but has a hx of alcohol use  Not present  Patient currently using heroin and crystal meth, but sister unsure of how often  Patient does not have a POA, but patient gave permission to contact his sister  Patient does not work, but 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  Patient is able to drive but is currently not driving  His cousins transport as needed  Patient's cousins will transport at discharge  CM reviewed discharge planning process including the following: identifying help at home, patient preference for discharge planning needs, pharmacy preference, and availability of treatment team to discuss questions or concerns patient and/or family may have regarding understanding medications and recognizing signs and symptoms once discharged  CM also encouraged patient to follow up with all recommended appointments after discharge  Patient advised of importance for patient and family to participate in managing patients medical well being  Patient is anticipated to be discharged today  No Cm needs anticipated  Cm department will continue to follow patient through discharge

## 2021-01-14 NOTE — PROGRESS NOTES
Vancomycin IV Pharmacy-to-Dose Consultation    Johnnie Mancera is a 62 y o  male who is currently receiving Vancomycin IV with management by the Pharmacy Consult service  Assessment/Plan:  The patient was reviewed  Renal function is stable and no signs or symptoms of nephrotoxicity and/or infusion reactions were documented in the chart  Based on todays assessment, continue current vancomycin (day # 2) dosing of 1000 mg IV q 12 H, with a plan for trough to be drawn at 0800 on 1/15/21  We will continue to follow the patients culture results and clinical progress daily      Mahesh Crespo, Pharmacist

## 2021-01-14 NOTE — ASSESSMENT & PLAN NOTE
· Normal mentation now  · Wants to go home- cleared for dC  · Cessation counseling done thoroughly for 30 mins      · Presenting to the ED with drug overdose suspected meth and heroin  · Receive several doses of Narcan with minimal improvement, however did have moderate improvement with Narcan infusion  · Tolerating a regular diet now, continue aspiration precautions  · P r n   Tylenol and Zofran  · CT head negative for acute pathology, incidental finding of mastoiditis

## 2021-01-14 NOTE — DISCHARGE INSTR - AVS FIRST PAGE
· You were diagnosed with ear discharge and have been started on ear drops and antibiotics that you must take  · Take antibiotics for a total of 10 days and it has been sent to your pharmacy at [facility]  · Also the ear drops - instill for 7-10 days   · You must follow up with ENT doctor or your PCP for the follow up of your ear infection otherwise you can lose your hearing permanently  · The list of PCP around here has been provided to you before discharge  · Get your blood work done in 1 week and then report it to your new PCP  · If you do not find a PCP you can go to urgent care to get your ears checked

## 2021-01-14 NOTE — PROGRESS NOTES
Vancomycin Assessment    Lillian Kumar is a 62 y o  male who is currently receiving vancomycin vanco 1250 mg iv q12h for skin-soft tissue infection     Relevant clinical data and objective history reviewed:  Creatinine   Date Value Ref Range Status   01/13/2021 1 27 0 60 - 1 30 mg/dL Final     Comment:     Standardized to IDMS reference method   01/12/2021 1 70 (H) 0 60 - 1 30 mg/dL Final     Comment:     Standardized to IDMS reference method     /75   Pulse 85   Temp 98 6 °F (37 °C)   Resp 18   Ht 5' 9" (1 753 m)   Wt 75 9 kg (167 lb 5 3 oz)   SpO2 98%   BMI 24 71 kg/m²   I/O last 3 completed shifts: In: 1200 [I V :1000; IV Piggyback:200]  Out: 350 [Urine:350]  Lab Results   Component Value Date/Time    BUN 12 01/13/2021 04:56 AM    WBC 16 52 (H) 01/13/2021 04:56 AM    HGB 12 3 01/13/2021 04:56 AM    HCT 38 9 01/13/2021 04:56 AM    MCV 87 01/13/2021 04:56 AM     01/13/2021 04:56 AM     Temp Readings from Last 3 Encounters:   01/13/21 98 6 °F (37 °C)   05/03/20 (!) 97 4 °F (36 3 °C) (Tympanic)     Vancomycin Days of Therapy: 1    Assessment/Plan  The patient is currently on vancomycin utilizing scheduled dosing based on actual body weight  Baseline risks associated with therapy include: concomitant nephrotoxic medications and dehydration  The patient is currently receiving vanco 1250 mg iv q12h and after clinical evaluation will be changed to vanco 1000 mg iv q12h  Pharmacy will also follow closely for s/sx of nephrotoxicity, infusion reactions, and appropriateness of therapy  BMP and CBC will be ordered per protocol  Plan for trough as patient approaches steady state, prior to the 4th  dose at approximately 0800 on 01/15/21  Due to infection severity, will target a trough of 15-20 (appropriate for most indications)   Pharmacy will continue to follow the patients culture results and clinical progress daily      Shivam Louis

## 2021-01-14 NOTE — CASE MANAGEMENT
Cm met with patient at bedside to provide patient with a list of [facility] PCPs  Cm asked patient to call one of the providers and schedule a follow up appointment if needed  Cm department will continue to follow patient through discharge

## 2021-01-14 NOTE — PLAN OF CARE
Problem: Nutrition/Hydration-ADULT  Goal: Nutrient/Hydration intake appropriate for improving, restoring or maintaining nutritional needs  Description: Monitor and assess patient's nutrition/hydration status for malnutrition  Collaborate with interdisciplinary team and initiate plan and interventions as ordered  Monitor patient's weight and dietary intake as ordered or per policy  Utilize nutrition screening tool and intervene as necessary  Determine patient's food preferences and provide high-protein, high-caloric foods as appropriate       INTERVENTIONS:  - Monitor oral intake, urinary output, labs, and treatment plans  - Assess nutrition and hydration status and recommend course of action  - Evaluate amount of meals eaten  - Assist patient with eating if necessary   - Allow adequate time for meals  - Recommend/ encourage appropriate diets, oral nutritional supplements, and vitamin/mineral supplements  - Order, calculate, and assess calorie counts as needed  - Recommend, monitor, and adjust tube feedings and TPN/PPN based on assessed needs  - Assess need for intravenous fluids  - Provide specific nutrition/hydration education as appropriate  - Include patient/family/caregiver in decisions related to nutrition  Outcome: Progressing     Problem: PAIN - ADULT  Goal: Verbalizes/displays adequate comfort level or baseline comfort level  Description: Interventions:  - Encourage patient to monitor pain and request assistance  - Assess pain using appropriate pain scale  - Administer analgesics based on type and severity of pain and evaluate response  - Implement non-pharmacological measures as appropriate and evaluate response  - Consider cultural and social influences on pain and pain management  - Notify physician/advanced practitioner if interventions unsuccessful or patient reports new pain  Outcome: Progressing     Problem: INFECTION - ADULT  Goal: Absence or prevention of progression during hospitalization  Description: INTERVENTIONS:  - Assess and monitor for signs and symptoms of infection  - Monitor lab/diagnostic results  - Monitor all insertion sites, i e  indwelling lines, tubes, and drains  - Monitor endotracheal if appropriate and nasal secretions for changes in amount and color  - [place] appropriate cooling/warming therapies per order  - Administer medications as ordered  - Instruct and encourage patient and family to use good hand hygiene technique  - Identify and instruct in appropriate isolation precautions for identified infection/condition  Outcome: Progressing  Goal: Absence of fever/infection during neutropenic period  Description: INTERVENTIONS:  - Monitor WBC    Outcome: Progressing     Problem: SAFETY ADULT  Goal: Patient will remain free of falls  Description: INTERVENTIONS:  - Assess patient frequently for physical needs  -  Identify cognitive and physical deficits and behaviors that affect risk of falls    -  [place] fall precautions as indicated by assessment   - Educate patient/family on patient safety including physical limitations  - Instruct patient to call for assistance with activity based on assessment  - Modify environment to reduce risk of injury  - Consider OT/PT consult to assist with strengthening/mobility  Outcome: Progressing  Goal: Maintain or return to baseline ADL function  Description: INTERVENTIONS:  -  Assess patient's ability to carry out ADLs; assess patient's baseline for ADL function and identify physical deficits which impact ability to perform ADLs (bathing, care of mouth/teeth, toileting, grooming, dressing, etc )  - Assess/evaluate cause of self-care deficits   - Assess range of motion  - Assess patient's mobility; develop plan if impaired  - Assess patient's need for assistive devices and provide as appropriate  - Encourage maximum independence but intervene and supervise when necessary  - Involve family in performance of ADLs  - Assess for home care needs following discharge   - Consider OT consult to assist with ADL evaluation and planning for discharge  - Provide patient education as appropriate  Outcome: Progressing  Goal: Maintain or return mobility status to optimal level  Description: INTERVENTIONS:  - Assess patient's baseline mobility status (ambulation, transfers, stairs, etc )    - Identify cognitive and physical deficits and behaviors that affect mobility  - Identify mobility aids required to assist with transfers and/or ambulation (gait belt, sit-to-stand, lift, walker, cane, etc )  - [place] fall precautions as indicated by assessment  - Record patient progress and toleration of activity level on Mobility SBAR; progress patient to next Phase/Stage  - Instruct patient to call for assistance with activity based on assessment  - Consider rehabilitation consult to assist with strengthening/weightbearing, etc   Outcome: Progressing     Problem: DISCHARGE PLANNING  Goal: Discharge to home or other facility with appropriate resources  Description: INTERVENTIONS:  - Identify barriers to discharge w/patient and caregiver  - Arrange for needed discharge resources and transportation as appropriate  - Identify discharge learning needs (meds, wound care, etc )  - Arrange for interpretive services to assist at discharge as needed  - Refer to Case Management Department for coordinating discharge planning if the patient needs post-hospital services based on physician/advanced practitioner order or complex needs related to functional status, cognitive ability, or social support system  Outcome: Progressing     Problem: Knowledge Deficit  Goal: Patient/family/caregiver demonstrates understanding of disease process, treatment plan, medications, and discharge instructions  Description: Complete learning assessment and assess knowledge base    Interventions:  - Provide teaching at level of understanding  - Provide teaching via preferred learning methods  Outcome: Progressing     Problem: Potential for Falls  Goal: Patient will remain free of falls  Description: INTERVENTIONS:  - Assess patient frequently for physical needs  -  Identify cognitive and physical deficits and behaviors that affect risk of falls    -  [place] fall precautions as indicated by assessment   - Educate patient/family on patient safety including physical limitations  - Instruct patient to call for assistance with activity based on assessment  - Modify environment to reduce risk of injury  - Consider OT/PT consult to assist with strengthening/mobility  Outcome: Progressing

## 2021-01-14 NOTE — ASSESSMENT & PLAN NOTE
· Resolved completely  · Likely secondary to drug overdose, on 1-2 L supplemental oxygen  · Chest x-ray negative for pneumonia  · Hypoxia continuing to improve with Narcan drip overnight

## 2021-01-14 NOTE — DISCHARGE SUMMARY
Discharge- Bernardo Carrasco 1960, 62 y o  male MRN: [id number]    Unit/Bed#: -01 Encounter: [id number]    Primary Care Provider: No primary care provider on file  Date and time admitted to hospital: 1/12/2021  7:44 AM        Acute suppurative otitis media  Assessment & Plan  ENT on board and cleared pt for DC  See plan under mastoiditis    Acute respiratory failure with hypoxia (HCC)  Assessment & Plan  · Resolved completely  · Likely secondary to drug overdose, on 1-2 L supplemental oxygen  · Chest x-ray negative for pneumonia  · Hypoxia continuing to improve with Narcan drip overnight    Mastoiditis of left side  Assessment & Plan  · ENT saw the patient and cleared for DC with oral augmentin at DC and ciprodex drops  · OP follow up recommended  · ENT not worried about the mastoiditis   · Was treated with IV zosyn and vanc here    Acute kidney injury ([facility] [address] )  Assessment & Plan  · Resolved with IV fluids    * Drug overdose  Assessment & Plan  · Normal mentation now  · Wants to go home- cleared for dC  · Cessation counseling done thoroughly for 30 mins      · Presenting to the ED with drug overdose suspected meth and heroin  · Receive several doses of Narcan with minimal improvement, however did have moderate improvement with Narcan infusion  · Tolerating a regular diet now, continue aspiration precautions  · P r n   Tylenol and Zofran  · CT head negative for acute pathology, incidental finding of mastoiditis         - Toxic encephalopathy due to amphetamine/meth OD            Resolved Problems  Date Reviewed: 1/14/2021    None          Admission Date:   Admission Orders (From admission, onward)     Ordered        01/13/21 1256  Inpatient Admission  Once         01/12/21 1422  Place in Observation  Once                     Admitting Diagnosis: Overdose [T50 901A]  Hypoxia [R09 02]  Polysubstance abuse ([address] ) [F19 10]  Heroin abuse ([address] ) [F11 10]    HPI: Bernardo Carrasco is a 62 y o  male who presents with drug overdose  Patient was found unresponsive in a drug then surrounded by frozen chickens, getting CPR from his friends, on EMS arrival   No further information can be obtained from patient due to his lethargic/obtunded mental status  Now admitted to Medicine status post 2 rounds of IV Narcan with minimal improvement, on Narcan infusion  Procedures Performed:   Orders Placed This Encounter   Procedures   [facility] ED ECG Documentation Only       Summary of Hospital Course:  Patient was followed very closely as he was initially on a Narcan drip after getting the CP R  He does complain of some superficial chest tenderness status post CP R  He is completely alert and oriented now and wants to go home  He is off the Narcan drip  He is off the IV fluids as well for his renal functions have completely normalized  He has left ear otitis media for which ENT was consulted and they recommend doing outpatient Augmentin and Ciprodex drops with outpatient follow-up  His hearing is significantly affected by this and it has been brought to his attention thoroughly that if he does not follow up with doctors on the outside and does not finish his antibiotic course he may lose his hearing  He verbalizes understanding  His medications have been sent to his pharmacy at [facility]     Significant Findings, Care, Treatment and Services Provided:   CT head without contrast [[id number]] Collected: 01/12/21 1357   Order Status: Completed Updated: 01/12/21 1406   Narrative:     CT BRAIN - WITHOUT CONTRAST     INDICATION:   ams   "Overdose"     Patient has suspected COVID-19  COMPARISON:  None  TECHNIQUE:  CT examination of the brain was performed   In addition to axial images, sagittal and coronal 2D reformatted images were created and submitted for interpretation       Radiation dose length product (DLP) for this visit:  827 mGy-cm    This examination, like all CT scans performed in the [facility], was performed utilizing techniques to minimize radiation dose exposure, including the use of iterative   reconstruction and automated exposure control        IMAGE QUALITY:  Diagnostic  FINDINGS:   PARENCHYMA: Decreased attenuation is noted in periventricular and subcortical white matter demonstrating an appearance that is statistically most likely to represent mild microangiopathic change  No CT signs of acute infarction   No intracranial mass, mass effect or midline shift   No acute parenchymal hemorrhage  VENTRICLES AND EXTRA-AXIAL SPACES:  Normal for the patient's age  VISUALIZED ORBITS AND PARANASAL SINUSES:  No acute abnormality involving the orbits   Mild scattered sinus mucosal thickening is noted   Small fluid levels in the sphenoid sinuses and left maxillary sinus   Partial opacification of left side mastoid air   cells   Trace amount of fluid in the middle ear canals bilaterally  CALVARIUM AND EXTRACRANIAL SOFT TISSUES:  Normal    Mild soft tissue swelling right occipital scalp      Impression:     No acute intracranial abnormality  Acute on chronic sinusitis  Age-indeterminate mild left mastoiditis  Traceable to fluid in the middle ear canals bilaterally  Workstation performed: [id number]    XR chest 2 views [[id number]] Collected: 01/12/21 1025   Order Status: Completed Updated: 01/12/21 1027   Narrative:     CHEST     INDICATION:   cp  COMPARISON: Aparna Yoder PERFORMED/VIEWS: [facility] CHEST PA & LATERAL       FINDINGS:     Cardiomediastinal silhouette appears unremarkable  The lungs are clear   No pneumothorax or pleural effusion  Osseous structures appear within normal limits for patient age  Impression:       No acute cardiopulmonary disease            Complications:  May lose his hearing if does not follow up outpatient with PCP or ENT    Condition at Discharge: stable     General- Awake, alert and oriented x 3, looks comfortable, poor dentition  HEENT- Normocephalic, atraumatic, oral mucosa- moist, left ear drainage visible, no mastoid tenderness, no otitis externa  Neck- Supple, No carotid bruit, no JVD  CVS- Normal S1/ S2, Regular rate and rhythm, No murmur, No edema  Respiratory system- B/L clear breath sounds, no wheezing  Abdomen- Soft, Non distended, no tenderness, Bowel sound- present 4 quads  Genitourinary- No suprapubic tenderness, No CVA tenderness  Skin- No new bruise or rash  Musculoskeletal- No gross deformity  Psych- No acute psychosis  CNS- CN II- XII grossly intact, No acute focal neurologic deficit noted      Discharge instructions/Information to patient and family:   See after visit summary for information provided to patient and family  Provisions for Follow-Up Care:  See after visit summary for information related to follow-up care and any pertinent home health orders  PCP: No primary care provider on file  Disposition: Home    Planned Readmission: No    Discharge Statement   I spent 55 minutes discharging the patient  This time was spent on the day of discharge  I had direct contact with the patient on the day of discharge  Additional documentation is required if more than 30 minutes were spent on discharge  Discharge Medications:  See after visit summary for reconciled discharge medications provided to patient and family
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
